# Patient Record
Sex: FEMALE | Race: WHITE | NOT HISPANIC OR LATINO | Employment: OTHER | URBAN - METROPOLITAN AREA
[De-identification: names, ages, dates, MRNs, and addresses within clinical notes are randomized per-mention and may not be internally consistent; named-entity substitution may affect disease eponyms.]

---

## 2018-02-28 ENCOUNTER — OFFICE VISIT (OUTPATIENT)
Dept: UROGYNECOLOGY | Facility: CLINIC | Age: 68
End: 2018-02-28
Payer: MEDICARE

## 2018-02-28 VITALS
WEIGHT: 123.44 LBS | TEMPERATURE: 98 F | HEART RATE: 65 BPM | HEIGHT: 63 IN | SYSTOLIC BLOOD PRESSURE: 112 MMHG | DIASTOLIC BLOOD PRESSURE: 64 MMHG | BODY MASS INDEX: 21.87 KG/M2

## 2018-02-28 DIAGNOSIS — R15.2 FECAL URGENCY: ICD-10-CM

## 2018-02-28 DIAGNOSIS — R39.89 CYSTALGIA: ICD-10-CM

## 2018-02-28 DIAGNOSIS — R35.0 URINARY FREQUENCY: Primary | ICD-10-CM

## 2018-02-28 DIAGNOSIS — M62.89 HIGH-TONE PELVIC FLOOR DYSFUNCTION: ICD-10-CM

## 2018-02-28 DIAGNOSIS — K62.89 PROCTALGIA: ICD-10-CM

## 2018-02-28 DIAGNOSIS — N30.10 IC (INTERSTITIAL CYSTITIS): ICD-10-CM

## 2018-02-28 LAB
BILIRUB SERPL-MCNC: ABNORMAL MG/DL
BLOOD URINE, POC: 250
COLOR, POC UA: ABNORMAL
GLUCOSE UR QL STRIP: ABNORMAL
KETONES UR QL STRIP: ABNORMAL
LEUKOCYTE ESTERASE URINE, POC: ABNORMAL
NITRITE, POC UA: ABNORMAL
PH, POC UA: 5
PROTEIN, POC: ABNORMAL
SPECIFIC GRAVITY, POC UA: 1.01
UROBILINOGEN, POC UA: ABNORMAL

## 2018-02-28 PROCEDURE — 99204 OFFICE O/P NEW MOD 45 MIN: CPT | Mod: S$PBB,,, | Performed by: OBSTETRICS & GYNECOLOGY

## 2018-02-28 PROCEDURE — 81002 URINALYSIS NONAUTO W/O SCOPE: CPT | Mod: PBBFAC,PO | Performed by: OBSTETRICS & GYNECOLOGY

## 2018-02-28 PROCEDURE — 99203 OFFICE O/P NEW LOW 30 MIN: CPT | Mod: PBBFAC,PO | Performed by: OBSTETRICS & GYNECOLOGY

## 2018-02-28 PROCEDURE — 1125F AMNT PAIN NOTED PAIN PRSNT: CPT | Mod: ,,, | Performed by: OBSTETRICS & GYNECOLOGY

## 2018-02-28 PROCEDURE — 99999 PR PBB SHADOW E&M-NEW PATIENT-LVL III: CPT | Mod: PBBFAC,,, | Performed by: OBSTETRICS & GYNECOLOGY

## 2018-02-28 PROCEDURE — 1159F MED LIST DOCD IN RCRD: CPT | Mod: ,,, | Performed by: OBSTETRICS & GYNECOLOGY

## 2018-02-28 RX ORDER — LEVOTHYROXINE SODIUM 100 UG/1
100 TABLET ORAL
COMMUNITY

## 2018-02-28 RX ORDER — SERTRALINE HYDROCHLORIDE 100 MG/1
100 TABLET, FILM COATED ORAL
COMMUNITY

## 2018-02-28 RX ORDER — LISINOPRIL AND HYDROCHLOROTHIAZIDE 10; 12.5 MG/1; MG/1
TABLET ORAL
COMMUNITY
Start: 2018-01-04

## 2018-02-28 RX ORDER — LOVASTATIN 20 MG/1
TABLET ORAL
COMMUNITY
Start: 2018-01-24

## 2018-02-28 RX ORDER — AMITRIPTYLINE HYDROCHLORIDE 25 MG/1
25 TABLET, FILM COATED ORAL NIGHTLY
Qty: 30 TABLET | Refills: 11 | Status: SHIPPED | OUTPATIENT
Start: 2018-02-28 | End: 2019-02-28

## 2018-02-28 RX ORDER — OXYBUTYNIN CHLORIDE 5 MG/1
5 TABLET, EXTENDED RELEASE ORAL
COMMUNITY

## 2018-02-28 RX ORDER — ESTRADIOL 0.1 MG/G
1 CREAM VAGINAL
COMMUNITY
Start: 2017-10-26

## 2018-02-28 NOTE — LETTER
February 28, 2018      Petrona Ware, FN  [address]  [place] [address]           [place] [facility]  [address] [facility], [address] [place] [address]  Phone: [phone]  Fax: [phone]          Patient: Luz Hooper   MR Number: [id number]   YOB: 1950   Date of Visit: 2/28/2018       Dear Petrona Ware:    Thank you for referring Luz Hooper to me for evaluation. Attached you will find relevant portions of my assessment and plan of care.    If you have questions, please do not hesitate to call me. I look forward to following Luz Hooper along with you.    Sincerely,    Manjit Echeverria MD    Enclosure  CC:  No Recipients    If you would like to receive this communication electronically, please contact [email or web address] or [phone] to request more information on [facility] access.    For providers and/or their staff who would like to refer a patient to [facility], please contact us through our one-stop-shop provider referral line, [facility], at [phone].    If you feel you have received this communication in error or would no longer like to receive these types of communications, please e-mail [email or web address]

## 2018-02-28 NOTE — PROGRESS NOTES
Subjective:     Chief Complaint: bladder pressure  History of Present Illness: Luz Hooper is a 68 y.o. female attended by her  who presents for a 4 year history of worsening bladder and pelvic pressure and pain.  Prior to this she was treated for overactive bladder for which she took Detrol.  She had no known triggering events that started the pain.  It is now spread throughout the pelvis including causing pressure on the rectum.  Sometimes she has an urge for bowel movement but does not need to have one.  She questions whether her bladder is pushing down causing the problem.  She has pain in the vaginal area but she is not sexually active so there has been no dyspareunia.  She says she was told that she had inflamed muscles around the bladder and was given antibiotics and oxybutynin.  She did not see any improvement with this.  She had a hysterectomy 21 years ago for endometriosis and says she had a bladder tack at the same time.  Not on any systemic hormones.  She has frequency and urgency but very little control problems including urge or stress incontinence.  She occasionally has nocturia.  She does not notice any significant improvement when she is off her feet or when she sleeps at night.  She says the problem is getting unbearable.  She drinks one carbonated drink per day and one cup of coffee.  She drinks chocolate milk on a regular basis    Review of Systems    Constitutional: No acute distress. No weight gain/loss.  Eyes: No vision changes.  ENT: No headaches.   Respiratory: COPD  Cardiovascular:  Hypertension  Gastrointestinal: No constipation. No diarrhea. No fecal incontinence.   Genitourinary: No vaginal bleeding or discharge.  Integument/Breast: Negative  Hematologic/Lymphatic: No history of anemia.  Musculoskeletal:  back pain.   Neurological: No known disc problems. No paresthesias.  Behavioral/Psych:  history of depression.   Endocrine: thyroid hormonal  replacement.  Allergy/Immune: no recent reactions     Objective:   General Exam:  General appearance: WDNF. NAD.   HEENT: Penelope. EOM's intact.  Neck: Normal thyroid.   Back: No CVA tenderness.  RESP: No SOB.  Breasts: deferred  Abdomen: Benign without localizing signs.  Extremities: No edema. No varices.  Lymphatic: noncontributory  Skin: No rashes. No lesions.  Neurologic: Intact.   Psych: Oriented.   Pelvic Exam:  V:  No lesions. No palpable nodes.  No evidence of vestibulitis.  Normal hiatus  Va:No d/c bleeding or lesions.  Minimal apical cystocele and minimal diffuse relaxation  .Meatus:No caruncle or stenosis.  Slightly everted  Urethra: Non tender. No suburethral masses.  No significant hypermobility  Cx/Cuff: Normal   Uterus: Surgically absent.  Ad: No mass or tenderness.  Levators :Symmetrical.  Bandlike with increased tone.  tender.  BL: most tender location on exam  RV: No hemorrhoids.  Assessment:   Cystalgia, most likely interstitial cystitis  Pelvic floor dysfunction which is likely secondary to the IC       Plan:    Avoidance of caffeine and chocolate and carbonated drinks  pentosan and amitriptyline

## 2018-03-05 ENCOUNTER — TELEPHONE (OUTPATIENT)
Dept: UROLOGY | Facility: CLINIC | Age: 68
End: 2018-03-05

## 2018-03-05 RX ORDER — GABAPENTIN 100 MG/1
100 CAPSULE ORAL 2 TIMES DAILY
Qty: 60 CAPSULE | Refills: 6 | Status: SHIPPED | OUTPATIENT
Start: 2018-03-05

## 2018-03-05 NOTE — TELEPHONE ENCOUNTER
----- Message from Phuong Aguero sent at 3/1/2018  1:16 PM CST -----  Patient states she was prescribed a medication yesterday she does not know the name, her cost is $800 she is requesting an alternative called into [facility] at [phone],  contact patient at [phone].      [facility] #[id number] - [place], [place] - [address] [facility]  [address]  [place] [address]  Phone: [phone] Fax: [phone]

## 2018-03-05 NOTE — TELEPHONE ENCOUNTER
Unfortunately elmiron is the only oral medication approved by the FDA for treatment of interstitial cystitis.  The other options are instillations of Elmiron in the office or we can try another medication to reduce symptoms but it would not be nearly as effective.  Will send in a prescription for something else but we need to see her within 3-4 weeks so that we can come up with an alternative plan........ let her know that I had to try 6 different medications before could find any that her insurance would cover at all

## 2018-03-05 NOTE — TELEPHONE ENCOUNTER
----- Message from Melanie Berman sent at 3/2/2018  8:28 AM CST -----  Contact: self   Patient called to check status of rx, she stated a cheaper prescription was supposed to be called in for her. Please call back at [phone] (home)

## 2018-03-07 NOTE — TELEPHONE ENCOUNTER
Patient now has the medication and will see if it helps. She will contact the office if she has any questions or concerns.